# Patient Record
Sex: FEMALE | Race: BLACK OR AFRICAN AMERICAN
[De-identification: names, ages, dates, MRNs, and addresses within clinical notes are randomized per-mention and may not be internally consistent; named-entity substitution may affect disease eponyms.]

---

## 2019-12-09 ENCOUNTER — HOSPITAL ENCOUNTER (EMERGENCY)
Dept: HOSPITAL 72 - EMR | Age: 64
Discharge: HOME | End: 2019-12-09
Payer: COMMERCIAL

## 2019-12-09 VITALS — DIASTOLIC BLOOD PRESSURE: 93 MMHG | SYSTOLIC BLOOD PRESSURE: 166 MMHG

## 2019-12-09 VITALS — HEIGHT: 63 IN | WEIGHT: 133 LBS | BODY MASS INDEX: 23.57 KG/M2

## 2019-12-09 DIAGNOSIS — Z91.041: ICD-10-CM

## 2019-12-09 DIAGNOSIS — S09.90XA: Primary | ICD-10-CM

## 2019-12-09 DIAGNOSIS — I10: ICD-10-CM

## 2019-12-09 DIAGNOSIS — Z91.013: ICD-10-CM

## 2019-12-09 DIAGNOSIS — Z88.6: ICD-10-CM
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
## 2019-12-09 DIAGNOSIS — Z48.02: ICD-10-CM
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
## 2019-12-09 PROCEDURE — 99281 EMR DPT VST MAYX REQ PHY/QHP: CPT

## 2019-12-09 NOTE — NUR
ED Nurse Note:



Patient walked into ED from home for staple removal on the top of her head. 
Staples were done here at [facility] on 11/30/19. Patient denies pain at this time.

## 2019-12-09 NOTE — EMERGENCY ROOM REPORT
History of Present Illness


General


Chief Complaint:  Wound Recheck/Suture Removal


Source:  Patient





Present Illness


HPI


Here for staple removal.





The patient had staples placed November 30.  Apparently she was drinking 

alcohol but does not remember what happened to cause the laceration.





Diabetic and hypertensive.





No fevers or chills.  No pus.  Pain is minimal at this time.


Allergies:  


Coded Allergies:  


     IODINE (Verified  Allergy, Unknown, 12/9/19)


     MORPHINE (Verified  Allergy, Unknown, 12/9/19)


     SHELLFISH DERIVED (Verified  Allergy, Unknown, 12/9/19)





Patient History


Past Medical History:  see triage record


Social History:  Reports: alcohol use


Social History Narrative


From home


Pregnant Now:  No


Reviewed Nursing Documentation:  PMH: Agreed; PSxH: Agreed





Nursing Documentation-PMH


Past Medical History:  No History, Except For


Hx Hypertension:  Yes


Hx Diabetes:  Yes





Review of Systems


Constitutional:  Denies: fever


Musculoskeletal:  Reports: see HPI


Skin:  Reports: see HPI


Neurological:  Reports: see HPI


Hematologic/Lymphatic:  Reports: see HPI





Physical Exam





Vital Signs








  Date Time  Temp Pulse Resp B/P (MAP) Pulse Ox O2 Delivery O2 Flow Rate FiO2


 


12/9/19 09:21 97.9 58 16 166/93 (117) 98 Room Air  








Sp02 EP Interpretation:  reviewed, normal


General Appearance:  well appearing, no apparent distress, GCS 15, non-toxic


Head:  normocephalic


Eyes:  bilateral eye normal inspection, bilateral eye PERRL, bilateral eye EOMI


ENT:  moist mucus membranes


Neck:  full range of motion, supple


Cardiovascular #1:  regular rate, rhythm


Gastrointestinal:  normal inspection


Musculoskeletal:  gait/station normal


Neurologic:  alert, oriented x3, grossly normal


Psychiatric:  mood/affect normal


Skin:  other - Wound is well-healed and staples are intact.





Medical Decision Making


Diagnostic Impression:  


 Primary Impression:  


 Removal of staples


 Additional Impression:  


 Unexplained head injury


ER Course


Patient here for staple removal.  The wound is well-healed.





Discussed with patient in detail that she needs to have work-up for possible 

syncope.





Staples removed without difficulty.  Tolerated well.





At this time she is clinically stable and evaluation is not needed.  She states 

she will follow-up with her doctor to have the work-up performed.





Stable for outpatient observation and treatment.





Last Vital Signs








  Date Time  Temp Pulse Resp B/P (MAP) Pulse Ox O2 Delivery O2 Flow Rate FiO2


 


12/9/19 09:55 98.0 58 16 165/95 99 Room Air  








Status:  improved


Disposition:  HOME, SELF-CARE


Condition:  Improved


Scripts


Ibuprofen* (MOTRIN*) 600 Mg Tablet


600 MG ORAL Q6H PRN for For Pain, #16 TAB 0 Refills


   Prov: Genaro Smith MD         12/9/19 


Bacitracin (Bacitracin) 28.4 Gm Oint...g.


1 APPLIC TOPIC BID, #10 GM


   Prov: Genaro Smith MD         12/9/19











Genaro Smith MD Dec 9, 2019 09:46

## 2020-06-14 ENCOUNTER — HOSPITAL ENCOUNTER (EMERGENCY)
Dept: HOSPITAL 72 - EMR | Age: 65
Discharge: HOME | End: 2020-06-14
Payer: COMMERCIAL

## 2020-06-14 VITALS — DIASTOLIC BLOOD PRESSURE: 76 MMHG | SYSTOLIC BLOOD PRESSURE: 124 MMHG

## 2020-06-14 VITALS — WEIGHT: 160 LBS | BODY MASS INDEX: 25.11 KG/M2 | HEIGHT: 67 IN

## 2020-06-14 VITALS — DIASTOLIC BLOOD PRESSURE: 86 MMHG | SYSTOLIC BLOOD PRESSURE: 149 MMHG

## 2020-06-14 DIAGNOSIS — S01.01XA: Primary | ICD-10-CM

## 2020-06-14 DIAGNOSIS — E11.9: ICD-10-CM

## 2020-06-14 DIAGNOSIS — Z91.013: ICD-10-CM

## 2020-06-14 DIAGNOSIS — Z91.041: ICD-10-CM

## 2020-06-14 DIAGNOSIS — F10.920: ICD-10-CM

## 2020-06-14 DIAGNOSIS — S09.90XA: ICD-10-CM

## 2020-06-14 DIAGNOSIS — Z88.6: ICD-10-CM

## 2020-06-14 DIAGNOSIS — I10: ICD-10-CM

## 2020-06-14 PROCEDURE — 99284 EMERGENCY DEPT VISIT MOD MDM: CPT

## 2020-06-14 PROCEDURE — 70450 CT HEAD/BRAIN W/O DYE: CPT

## 2020-06-14 NOTE — DIAGNOSTIC IMAGING REPORT
EXAM:

  CT Head Without Intravenous Contrast

 

CLINICAL HISTORY:

  TRAUMA

 

TECHNIQUE:

  Axial computed tomography images of the head/brain without intravenous 

contrast.  CTDI is 53 mGy and DLP is 1098 mGy-cm.  One or more of the 

following dose reduction techniques were used: automated exposure control,

 adjustment of the mA and/or kV according to patient size, use of 

iterative reconstruction technique.

 

COMPARISON:

  CT of the head dated 11/30/19.

 

FINDINGS:

  Brain:  Unremarkable.  No hemorrhage.  No significant white matter 

disease.  No edema.

  Ventricles:  Unremarkable.  No ventriculomegaly.

  Bones/joints:  Unremarkable.  No acute fracture.

  Soft tissues:  Unremarkable.

  Sinuses:  Unremarkable as visualized.  No acute sinusitis.

  Mastoid air cells:  Unremarkable as visualized.  No mastoid effusion.

 

IMPRESSION:     

  No acute intracranial pathology.

## 2020-06-14 NOTE — EMERGENCY ROOM REPORT
History of Present Illness


General


Chief Complaint:  Laceration


Source:  Patient





Present Illness


[facility]


This is a 65-year-old female with history of high blood pressure and diabetes.  

She presents with chief plate of head injury and laceration.  She states she 

has a couple of tequila shots tonight.  She does not remember falling at all.  

Her daughter noticed that she was bleeding and brought her here.  Patient has a 

laceration to the scalp.  Unknown loss of consciousness.  No fever chills.  

Tenderness to that area.


Allergies:  


Coded Allergies:  


     IODINE (Verified  Allergy, Unknown, 12/9/19)


     MORPHINE (Verified  Allergy, Unknown, 12/9/19)


     SHELLFISH DERIVED (Verified  Allergy, Unknown, 12/9/19)





COVID-19 Screening


Contact w/high risk pt:  No


Recent Travel to affected area:  No


Experienced COVID-19 symptoms?:  No


COVID-19 Testing performed PTA:  No





Patient History


Past Medical History:  see triage record, old chart reviewed, DM, HTN


Past Surgical History:  none


Pertinent Family History:  none


Social History:  Reports: alcohol use


Pregnant Now:  No


Immunizations:  other


Reviewed Nursing Documentation:  PMH: Agreed; PSxH: Agreed





Nursing Documentation-PMH


Hx Hypertension:  Yes


Hx Diabetes:  Yes





Review of Systems


Eye:  Denies: eye pain, blurred vision


ENT:  Denies: ear pain, nose congestion, throat swelling


Respiratory:  Denies: cough, shortness of breath


Cardiovascular:  Denies: chest pain, palpitations


Gastrointestinal:  Denies: abdominal pain, diarrhea, nausea, vomiting


Musculoskeletal:  Denies: back pain, joint pain


Skin:  Denies: rash


Neurological:  Denies: headache, numbness


Endocrine:  Denies: increased thirst, increased urine


Hematologic/Lymphatic:  Denies: easy bruising


All Other Systems:  negative except mentioned in HPI





Physical Exam





Vital Signs








  Date Time  Temp Pulse Resp B/P (MAP) Pulse Ox O2 Delivery O2 Flow Rate FiO2


 


6/14/20 01:05 98.4 57 16 157/94 (115) 95 Room Air  





Vitals with high blood pressure


Sp02 EP Interpretation:  reviewed, normal


General Appearance:  well appearing, no apparent distress, alert


Head:  normocephalic, other - 4 cm laceration to the right parietal scalp 

medially.


Eyes:  bilateral eye PERRL, bilateral eye EOMI


ENT:  hearing grossly normal, normal pharynx


Neck:  full range of motion, supple, no meningismus


Respiratory:  chest non-tender, lungs clear, normal breath sounds


Cardiovascular #1:  regular rate, rhythm, no murmur


Gastrointestinal:  normal bowel sounds, non tender, no mass, no organomegaly, 

no bruit, non-distended


Musculoskeletal:  back normal, normal range of motion, gait/station normal


Psychiatric:  mood/affect normal





Procedures


Laceration/Wound Repair


Laceration/Wound Repair :  


   Consent:  Verbal


   Wound Location:  head


   Wound's Depth, Shape:  into muscle, linear


   Wound Length (cm):  4


   Wound Explored:  clean


   Wound Repaired With:  staples


   Number of Sutures:  5


   Patient Tolerated:  Well


   Complications:  None





Medical Decision Making


Diagnostic Impression:  


 Primary Impression:  


 Head injury, acute


 Qualified Codes:  S09.90XA - Unspecified injury of head, initial encounter


 Additional Impressions:  


 Scalp laceration


 Qualified Codes:  S01.01XA - Laceration without foreign body of scalp, initial 

encounter


 Alcohol intoxication


 Qualified Codes:  F10.920 - Alcohol use, unspecified with intoxication, 

uncomplicated


ER Course


Patient presents with head injury and scalp laceration.  This probably 

secondary to her intoxication.  No evidence of any skull fracture or 

intracranial bleed.  Will discharge home.


CT/MRI/US Diagnostic Results


CT/MRI/US Diagnostic Results :  


   Imaging Test Ordered:  CT head


   Impression


Read by radiologist.  Negative.





Last Vital Signs








  Date Time  Temp Pulse Resp B/P (MAP) Pulse Ox O2 Delivery O2 Flow Rate FiO2


 


6/14/20 01:05 98.4 57 16 157/94 (115) 95 Room Air  








Status:  improved


Disposition:  HOME, SELF-CARE


Condition:  Stable





Additional Instructions:  


Follow-up with your doctor in 7 days.  Staples out in 7 days.  Return if worse.











Natalio Garcia MD Jun 14, 2020 01:15

## 2020-06-25 ENCOUNTER — HOSPITAL ENCOUNTER (EMERGENCY)
Dept: HOSPITAL 72 - EMR | Age: 65
Discharge: HOME | End: 2020-06-25
Payer: COMMERCIAL

## 2020-06-25 VITALS — SYSTOLIC BLOOD PRESSURE: 164 MMHG | DIASTOLIC BLOOD PRESSURE: 98 MMHG

## 2020-06-25 VITALS — BODY MASS INDEX: 21.26 KG/M2 | WEIGHT: 120 LBS | HEIGHT: 63 IN

## 2020-06-25 VITALS — SYSTOLIC BLOOD PRESSURE: 178 MMHG | DIASTOLIC BLOOD PRESSURE: 104 MMHG
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
## 2020-06-25 DIAGNOSIS — Z88.5: ICD-10-CM

## 2020-06-25 DIAGNOSIS — S01.01XD: ICD-10-CM

## 2020-06-25 DIAGNOSIS — Z88.8: ICD-10-CM

## 2020-06-25 DIAGNOSIS — I10: ICD-10-CM

## 2020-06-25 DIAGNOSIS — E11.9: ICD-10-CM

## 2020-06-25 DIAGNOSIS — Z91.013: ICD-10-CM

## 2020-06-25 DIAGNOSIS — Z48.02: Primary | ICD-10-CM

## 2020-06-25 PROCEDURE — 99281 EMR DPT VST MAYX REQ PHY/QHP: CPT

## 2020-06-25 NOTE — EMERGENCY ROOM REPORT
History of Present Illness


General


Chief Complaint:  Wound Recheck/Suture Removal


Source:  Patient





Present Illness


HPI


[age]-year-old female presents for suture removal no other complaints had staples 

placed to the posterior aspect of her head


Allergies:  


Coded Allergies:  


     IODINE (Verified  Allergy, Unknown, 12/9/19)


     MORPHINE (Verified  Allergy, Unknown, 12/9/19)


     SHELLFISH DERIVED (Verified  Allergy, Unknown, 12/9/19)





COVID-19 Screening


Contact w/high risk pt:  No


Recent Travel to affected area:  No


Experienced COVID-19 symptoms?:  No


COVID-19 Testing performed PTA:  No





Patient History


Social History:  Reports: drug use - Social


Last Menstrual Period:  na


Pregnant Now:  No


Reviewed Nursing Documentation:  PMH: Agreed; PSxH: Agreed





Nursing Documentation-PMH


Past Medical History:  No History, Except For


Hx Hypertension:  Yes


Hx Diabetes:  Yes





Review of Systems


All Other Systems:  negative except mentioned in HPI





Physical Exam





Vital Signs








  Date Time  Temp Pulse Resp B/P (MAP) Pulse Ox O2 Delivery O2 Flow Rate FiO2


 


6/25/20 12:07  18 18 178/104 (128) 98 Room Air  


 


6/25/20 12:13 97.6       








General Appearance:  well appearing, no apparent distress


Head:  normocephalic, other - 5 staples in place posterior head


ENT:  hearing grossly normal, normal voice


Neck:  full range of motion, supple


Respiratory:  no respiratory distress, speaking full sentences


Neurologic:  alert, normal gait


Psychiatric:  mood/affect normal


Skin:  no rash





Medical Decision Making


Diagnostic Impression:  


 Primary Impression:  


 Encounter for removal of sutures


ER Course


Patient presents for staple removal, 5 staples removed patient tolerated 

procedure well disposition home with return precautions follow-up with PCP





Last Vital Signs








  Date Time  Temp Pulse Resp B/P (MAP) Pulse Ox O2 Delivery O2 Flow Rate FiO2


 


6/25/20 12:13 97.6 81 18 178/104 98 Room Air  








Disposition:  HOME, SELF-CARE


Condition:  Stable


Referrals:  


[facility]





[facility]


Patient Instructions:  Suture Removal, Care After





Additional Instructions:  


The patient was provided with discharge instructions, notified to follow-up 

with a primary care doctor and or specialist in the next 24-48 hours, and to 

return to the ED if they have worsening of their symptoms. 





Please note that this report is being documented using DRAGON technology.


This can lead to erroneous entry secondary to incorrect interpretation by the 

dictating instrument.











Dayron Valle MD Jun 25, 2020 12:18

## 2021-08-26 NOTE — NUR
ED Nurse Note:



ERMD at bedside. Patient head laceration stapled by ERMCORBY.
ED Nurse Note:



Pt walked into the ED with complaints of upper scalp bleed on the posterior 
right side of the head. Per she did not know where she got the head injury. 
Patient admitted taking alcohol before going to the ER. Patient is AAOx4 but 
requires assistance in walking. Placed patient on a monitor bed
ER DISCHARGE NOTE:



Patient is cleared to be discharged per ERMD, pt is aox4, on room air, with 
stable vital signs. pt was given dc and prescription instructions, pt was able 
to verbalize understanding, pt id band removed. pt was assisted going out of 
the ED. pt took all belongings.
no